# Patient Record
Sex: FEMALE | Race: WHITE | NOT HISPANIC OR LATINO | Employment: OTHER | URBAN - METROPOLITAN AREA
[De-identification: names, ages, dates, MRNs, and addresses within clinical notes are randomized per-mention and may not be internally consistent; named-entity substitution may affect disease eponyms.]

---

## 2017-06-13 ENCOUNTER — OFFICE VISIT (OUTPATIENT)
Dept: OBSTETRICS AND GYNECOLOGY | Facility: CLINIC | Age: 72
End: 2017-06-13

## 2017-06-13 VITALS
SYSTOLIC BLOOD PRESSURE: 170 MMHG | HEIGHT: 60 IN | WEIGHT: 137 LBS | DIASTOLIC BLOOD PRESSURE: 90 MMHG | BODY MASS INDEX: 26.9 KG/M2

## 2017-06-13 DIAGNOSIS — N81.3: ICD-10-CM

## 2017-06-13 DIAGNOSIS — N95.0 PMB (POSTMENOPAUSAL BLEEDING): Primary | ICD-10-CM

## 2017-06-13 PROCEDURE — 99203 OFFICE O/P NEW LOW 30 MIN: CPT | Performed by: OBSTETRICS & GYNECOLOGY

## 2017-06-13 RX ORDER — FLUTICASONE PROPIONATE 110 UG/1
1 AEROSOL, METERED RESPIRATORY (INHALATION) DAILY
COMMUNITY

## 2017-06-13 RX ORDER — LEVOTHYROXINE SODIUM 0.1 MG/1
50 TABLET ORAL DAILY
COMMUNITY
End: 2017-07-30 | Stop reason: DRUGHIGH

## 2017-06-13 RX ORDER — BENAZEPRIL HYDROCHLORIDE 10 MG/1
20 TABLET ORAL DAILY
COMMUNITY
End: 2017-07-30 | Stop reason: DRUGHIGH

## 2017-06-13 NOTE — PROGRESS NOTES
Subjective   Chief Complaint   Patient presents with   • Gynecologic Exam   • Vaginal Bleeding     seen ER at [facility]. Had US and CAT scan, UA done   • Vaginal Prolapse     Melani Medina is a 71 y.o. year old  menopausal female presenting to be seen for her annual exam.  This past year she has not been on hormone replacement therapy.  There has been vaginal bleeding in the last 12 months. Patient had spotting for the last 3 months occasionally. On 17 the patient began bleeding heavily and was seen in [facility] ED. A vaginal US and CT were done. There is no bleeding now..Patient has Uterine prolapse for years.    SEXUAL Hx:  She is not currently sexually active.  In the past year there has not been new sexual partners.    Condoms are not typically used.  She would not like to be screened for STD's at today's exam.  HEALTH Hx:  She exercises regularly: yes.  She wears her seat belt:yes.  She has concerns about domestic violence: no.  She has noticed changes in height: no.  OTHER COMPLAINTS:  Nothing else    The following portions of the patient's history were reviewed and updated as appropriate:problem list, current medications, allergies, past family history, past medical history, past social history and past surgical history.    Smoking status: Never Smoker                                                                 Smokeless status: Not on file                         Review of Systems  Review of Systems - History obtained from the patient  General ROS: negative  Psychological ROS: negative  ENT ROS: negative  Allergy and Immunology ROS: positive for - seasonal allergies  Hematological and Lymphatic ROS: positive for - bruising  Endocrine ROS: positive for - hypothyroidism   Breast ROS: negative for breast lumps  No mammogram  Respiratory ROS: positive for - wheezing  Cardiovascular ROS: no chest pain or dyspnea on exertion  Gastrointestinal ROS: no abdominal pain, change in bowel habits, or black or bloody  "stools  No colonoscopy  Genito-Urinary ROS: no dysuria, trouble voiding, or hematuria  Musculoskeletal ROS: negative  Neurological ROS: no TIA or stroke symptoms  Dermatological ROS: negative          Objective   /90  Ht 60\" (152.4 cm)  Wt 137 lb (62.1 kg)  LMP 06/13/1990  BMI 26.76 kg/m2    General:  well developed; well nourished  no acute distress   Skin:  No suspicious lesions seen   Thyroid: normal to inspection and palpation   Breasts:  Examined in supine position  Symmetric without masses or skin dimpling  Nipples normal without inversion, lesions or discharge  There are no palpable axillary nodes   Abdomen: soft, non-tender; no masses  no umbilical or inginual hernias are present  no hepato-splenomegaly   Heart: regular rate and rhythm, S1, S2 normal, no murmur, click, rub or gallop   Lungs: clear to auscultation   Pelvis: Clinical staff was present for exam  External genitalia:  normal appearance of the external genitalia including Bartholin's and Redfield's glands.  :  urethral meatus normal; urethral hypermobility is absent.  Vaginal:  normal pink mucosa without prolapse or lesions.  Cervix:  normal appearance.  Uterus:  normal size, shape and consistency.  Adnexa:  normal bimanual exam of the adnexa.  Rectal:  digital rectal exam not performed; anus visually normal appearing.  Uterine GRADE 3          Assessment/Plan   Melani was seen today for gynecologic exam, vaginal bleeding and vaginal prolapse.    Diagnoses and all orders for this visit:    PMB (postmenopausal bleeding)  -     Ambulatory Referral to Gynecologic Oncology    Third degree uterine prolaps  -     Ambulatory Referral to Gynecologic Oncology       Send for US and CT  Appointment with Dr Hollins  Return 2 weeks     This note was electronically signed.    Juan Beasley MD   June 13, 2017  "

## 2017-06-27 ENCOUNTER — OFFICE VISIT (OUTPATIENT)
Dept: OBSTETRICS AND GYNECOLOGY | Facility: CLINIC | Age: 72
End: 2017-06-27

## 2017-06-27 VITALS
DIASTOLIC BLOOD PRESSURE: 90 MMHG | WEIGHT: 137 LBS | TEMPERATURE: 97.9 F | HEIGHT: 60 IN | SYSTOLIC BLOOD PRESSURE: 144 MMHG | BODY MASS INDEX: 26.9 KG/M2

## 2017-06-27 DIAGNOSIS — N95.0 PMB (POSTMENOPAUSAL BLEEDING): ICD-10-CM

## 2017-06-27 DIAGNOSIS — N81.3: Primary | ICD-10-CM

## 2017-06-27 PROCEDURE — 99211 OFF/OP EST MAY X REQ PHY/QHP: CPT | Performed by: OBSTETRICS & GYNECOLOGY

## 2017-06-27 NOTE — PROGRESS NOTES
Patient has an appointment on 7/13/17 with Dr Hollins, still having vaginal spotting, no records received from [facility]. No exam today    IMP: Uterine prolapse          Post menopausal bleeding    Plan: Refer to Dr Hollins, appointment on 7/13/17    Juan Beasley MD

## 2017-07-13 ENCOUNTER — OFFICE VISIT (OUTPATIENT)
Dept: GYNECOLOGIC ONCOLOGY | Facility: CLINIC | Age: 72
End: 2017-07-13

## 2017-07-13 VITALS
WEIGHT: 139 LBS | HEIGHT: 59 IN | HEART RATE: 65 BPM | SYSTOLIC BLOOD PRESSURE: 146 MMHG | RESPIRATION RATE: 16 BRPM | TEMPERATURE: 97.6 F | OXYGEN SATURATION: 95 % | BODY MASS INDEX: 28.02 KG/M2 | DIASTOLIC BLOOD PRESSURE: 71 MMHG

## 2017-07-13 DIAGNOSIS — N95.0 PMB (POSTMENOPAUSAL BLEEDING): ICD-10-CM

## 2017-07-13 DIAGNOSIS — B37.2 YEAST DERMATITIS: ICD-10-CM

## 2017-07-13 DIAGNOSIS — N81.3 COMPLETE UTERINE PROLAPSE: Primary | ICD-10-CM

## 2017-07-13 PROCEDURE — 99205 OFFICE O/P NEW HI 60 MIN: CPT | Performed by: OBSTETRICS & GYNECOLOGY

## 2017-07-13 RX ORDER — NYSTATIN 100000 [USP'U]/G
POWDER TOPICAL 2 TIMES DAILY
Qty: 1 EACH | Refills: 0 | Status: SHIPPED | OUTPATIENT
Start: 2017-07-13

## 2017-07-13 NOTE — PROGRESS NOTES
Melani Medina  [id number]  1945      Reason for visit:  Uterine prolapse and postmenopausal bleeding    Consultation:  Patient is being seen at the request of Dr. Beasley for evaluation of pelvic organ prolapse, postmenopausal bleeding.    History of present illness:  The patient is a 71 y.o. year old female who presents today for evaluation due to pelvic organ prolapse with vaginal bleeding.    The patient reports that she started to have vaginal spotting in the middle of May and she was scheduled to have her annual visit soon so she waited for that. Before she could get to that appt, she had an episode of heavy bleeding that prompted her to present to the emergency department at [facility]. She had a CT scan and TVUS there that showed 2mm endometrial thickness and no other gynecologic abnormalities. She was then eventually referred to Dr. Beasley who sent her to GYN Oncology. She says that since that 1 episode her bleeding has returned to bright red spotting and she also has vaginal discharge. As far as her prolapse, she feels that something is coming out and she has to splint often to empty her bladder or bowels. She denies constipation and feels that overall she empties well. She wears a pad for the spotting. She only gets up at night to check for bleeding at this time. She says her prolapse has been present for 4 years but has gotten much worse in the last few months. She desires to conserve vaginal function as she is  and would like to have intercourse. She was previously intimate prior to her prolapse worsening.  She notes intermittent complete procidentia.    OBGYN History:  She is a .  3 SVDs. Largest baby was 8lb 7oz. She does not use HRT. She does have a history of abnormal pap smear in  for which she had cryotherapy and had no other abnormal paps since. Her last one is unknown.     PCP: Akilah HORTON, [facility]. [place] office.      Oncologic  History:   No history exists.         Past Medical History:   Diagnosis Date   • Abnormal Pap smear of cervix    • Asthma    • Glaucoma    • Hypertension    • Hypothyroid    • Prolapse of uterus    • Skin cancer     mohs on nose       Past Surgical History:   Procedure Laterality Date   • CATARACT EXTRACTION Left     glaucoma   • CERVIX LESION DESTRUCTION     • LASIK Bilateral 2015   • SKIN CANCER EXCISION      mohs to nose       MEDICATIONS: The current medication list was reviewed with the patient and updated in the EMR this date per the Medical Assistant. Medication dosages and frequencies were confirmed to be accurate.    - Benazepril, synthroid, flovent    Allergies:  is allergic to other and sulfa antibiotics.    Social History:   Social History     Social History   • Marital status:      Spouse name: N/A   • Number of children: 3   • Years of education: N/A     Occupational History   • Not on file.     Social History Main Topics   • Smoking status: Former Smoker   • Smokeless tobacco: Not on file   • Alcohol use No   • Drug use: No   • Sexual activity: Defer     Other Topics Concern   • Not on file     Social History Narrative       Family History:    Family History   Problem Relation Age of Onset   • Heart disease Father    • Hypertension Father    • Other Father      pace maker   • Diabetes Brother    • Skin cancer Mother    • Other Mother      lung problems from smoking   • Breast cancer Neg Hx    • Ovarian cancer Neg Hx    • Uterine cancer Neg Hx    • Colon cancer Neg Hx        Health Maintenance:    Health Maintenance   Topic Date Due   • TDAP/TD VACCINES (1 - Tdap) 08/08/1964   • PNEUMOCOCCAL VACCINES (65+ LOW/MEDIUM RISK) (1 of 2 - PCV13) 08/08/2010   • HEPATITIS C SCREENING  06/13/2017   • MEDICARE ANNUAL WELLNESS  06/13/2017   • MAMMOGRAM  06/13/2017   • COLONOSCOPY  06/13/2017   • ZOSTER VACCINE  06/13/2017   • INFLUENZA VACCINE  08/01/2017       Review of Systems   Constitutional: Positive  "for activity change. Negative for appetite change, chills, fatigue, fever and unexpected weight change.   HENT: Negative for congestion, hearing loss, sore throat and trouble swallowing.    Eyes: Negative for redness and visual disturbance.        Glaucoma   Respiratory: Negative for cough, shortness of breath and wheezing.         Asthma for which patient \"sleeps propped up\"   Cardiovascular: Negative for chest pain, palpitations and leg swelling.   Gastrointestinal: Negative for abdominal distention, abdominal pain, blood in stool, constipation, diarrhea, nausea and vomiting.        Splints to defecate   Endocrine: Negative.  Negative for cold intolerance and heat intolerance.   Genitourinary: Positive for difficulty urinating, vaginal bleeding and vaginal discharge. Negative for dyspareunia, dysuria, frequency, genital sores, hematuria, pelvic pain, urgency and vaginal pain.        Splints to urinate   Musculoskeletal: Negative for arthralgias, back pain, gait problem and joint swelling.   Skin: Negative for rash and wound.   Allergic/Immunologic: Negative for food allergies and immunocompromised state.   Neurological: Negative for dizziness, tremors, seizures, syncope, weakness, light-headedness, numbness and headaches.   Hematological: Negative for adenopathy. Does not bruise/bleed easily.   Psychiatric/Behavioral: Negative.  Negative for confusion, dysphoric mood and sleep disturbance. The patient is not nervous/anxious.        Physical Exam    Vitals:    07/13/17 0946   BP: 146/71   Pulse: 65   Resp: 16   Temp: 97.6 °F (36.4 °C)   TempSrc: Temporal Artery    SpO2: 95%   Weight: 139 lb (63 kg)   Height: 58.5\" (148.6 cm)     Body mass index is 28.56 kg/(m^2).      GENERAL: Alert, well-appearing female appearing her stated age who is in no apparent distress.   HEENT: Sclera anicteric. Head normocephalic, atraumatic. Mucus membranes moist.   NECK: Trachea midline, supple, without masses.  No thyromegaly. "   BREASTS: Deferred  CARDIOVASCULAR: Normal rate, regular rhythm, no murmurs, rubs, or gallops.  No peripheral edema.  RESPIRATORY: Clear to auscultation bilaterally, normal respiratory effort  GASTROINTESTINAL:  Abdomen is soft, non-tender, non-distended, no rebound or guarding, no masses, or hernias. No HSM.   SKIN:  Warm, dry, well-perfused.  All visible areas intact.  No rashes, lesions, ulcers.  PSYCHIATRIC: AO x3, with appropriate affect, normal thought processes.  NEUROLOGIC: No focal deficits.  Moves extremities well.  MUSCULOSKELETAL: Normal gait and station.   EXTREMITIES:   No cyanosis, clubbing, symmetric.  LYMPHATICS:  No cervical or inguinal adenopathy noted.     PELVIC exam:    GYNECOLOGIC:  External genitalia normal but creases near legs demonstrate redness and irritation. Cervix noted to be visible at the level of the hymenal ring without Valsalva. Not much change to prolapse with bearing down. On speculum examination, the cervix appeared to be severely ulcerated circumferentially. Small rectocele noted on single blade exam. On bimanual examination no mass was appreciated.  Uterus was normal in size and shape. There is no cervical motion or uterine tenderness. No cervical mass was palpated. Parametria were smooth. Rectovaginal exam was deferred.     ECOG PS 1      Assessment/Plan   This is a 71 y.o. woman with significant symptomatic pelvic organ prolapse, post menopausal vaginal bleeding.     1. Prolapse; and intermittent complete procidentia, splinting for facilitation of defecation and urination.  - Reports from outside hospital CT and TVUS were available and reviewed today. On exam, there appears to be mostly apical prolapse with small anterior and posterior components. Cervix is ulcerated in several areas and is likely the cause of her symptoms. Definitive management would require hysterectomy which the patient desires. She says that this issue has been debilitating for her and she wants it  corrected. We discussed options for surgical management such as Robotic total laparoscopic hysterectomy/ bilateral salpingo-oophorectomy versus total vaginal hysterectomy/ bilateral salpingo-oophorectomy. Addition to these, we discussed methods to increase pelvic floor support such as laparoscopic sacralcolpopexy, uterosacral suspension, vs. colpocleisis. She is currently considering a vaginal preservation approach but has concerns about mesh placement. She was counseled about mesh complications including chronic pain, dyspareunia, infection (pelvic and osteomyelitis) and erosion that can require reoperation. She was counseled about uterosacral suspension that sutures are placed in close proximety of ureters and there is risk of ureteral injury, especially if done vaginally. The option of a pessary after vaginal hysterectomy was discussed as well. We still recommend hysterectomy to eliminate her vaginal bleeding and discharge.A surgical prolapse repair at time of hysterectomy is indicated. Due to her current issues with ulceration, we discussed that vaginal hysterectomy with pessary placement this would likely not be a good option, but she wants to consider this. The patient will consider her options and call the office after discussing with her .  She preferred for her  not to join in the discussion today at the office today.  Diagrams were drawn, there was extensive discussion regarding options, and patient was given a copy of the document.    2. Postmenopausal bleeding  - Outside scans show 2mm endometrial stripe which is not concerning for endometrial hyperplasia or endometrial caner. Exam reveals ulceration at cervix itself being likely source of bleeding with no blood coming from the os. No concern at this time for malignancy and no endometrial biopsy indicated at this time.     3. Cutaneous candidiasis  - Noted between legs near vulva. Discussed cool blow dryer to the area to keep dry and  prescribed nystatin powder.     FOLLOW UP: Return for patient is to call if she desires procedure..    This was a 1 hour long visit with 35 minutes spent in face-to-face consultation regarding management options for significant pelvic organ prolapse.    Note: Speech recognition transcription software was used to dictate portions of this document.  An attempt at proofreading has been made though minor errors in transcription may still be present.  Please do not hesitate to call our office with any questions.      Patient was seen and examined with Dr. Troncoso,  resident, who performed portions of the examination and documentation for this patient's care under my direct supervision.        Electronically Signed by: Kerline Hollins MD  Date: 7/14/2017

## 2017-07-20 ENCOUNTER — PREP FOR SURGERY (OUTPATIENT)
Dept: GYNECOLOGIC ONCOLOGY | Facility: CLINIC | Age: 72
End: 2017-07-20

## 2017-07-20 DIAGNOSIS — E07.9 DISORDER OF THYROID: ICD-10-CM

## 2017-07-20 DIAGNOSIS — N81.4 UTEROVAGINAL PROLAPSE: Primary | ICD-10-CM

## 2017-07-21 RX ORDER — ACETAMINOPHEN 325 MG/1
650 TABLET ORAL ONCE
Status: CANCELLED | OUTPATIENT
Start: 2017-07-21 | End: 2017-07-21

## 2017-07-21 RX ORDER — PREGABALIN 25 MG/1
150 CAPSULE ORAL ONCE
Status: CANCELLED | OUTPATIENT
Start: 2017-07-21 | End: 2017-07-21

## 2017-07-21 RX ORDER — SODIUM CHLORIDE 0.9 % (FLUSH) 0.9 %
1-10 SYRINGE (ML) INJECTION AS NEEDED
Status: CANCELLED | OUTPATIENT
Start: 2017-07-21

## 2017-07-24 ENCOUNTER — PREP FOR SURGERY (OUTPATIENT)
Dept: GYNECOLOGIC ONCOLOGY | Facility: CLINIC | Age: 72
End: 2017-07-24

## 2017-07-24 PROBLEM — N81.4 UTEROVAGINAL PROLAPSE: Status: ACTIVE | Noted: 2017-07-24

## 2017-07-24 NOTE — PATIENT INSTRUCTIONS
Gynecologic Oncology  Inpatient Pre-op Patient Education  *See checked boxes for your instructions*    Patient Name:  Melani Medina  [id number]  1945    Surgeon:  Dr. Hollins    Appointment  [x]  1. Your surgery has been scheduled on 7-31-17. You will need to be at the second floor surgery registration of the main hospital on that day at 8:30 AM.   [x] 2.  You have a pre-admission testing (PAT) appointment for labs and possibly chest xray and EKG, on 7-30-17 at 12:45 PM.  You will need to be at hospital registration on the first floor, 10 minutes before that time.     [x] 3.  The hospital registration department is located in the long hallway between the [address] buildings.     The Day(s) Before Surgery  [x] 1. On 7-30-17, the day prior to surgery, eat lightly.  No solid food after midnight on 7-30-17, including NO MILK, CREAM, OR ORANGE JUICE.  You may have sips of clear fluids up until two-three hours prior to your arrival to the hospital on the morning of surgery.     [] 2.  You may need to use a stool softener, the day prior to surgery to help with existing constipation and to clean out your bowels.  You can purchase Miralax over-the-counter at the pharmacy and follow the directions on the back.  (Do not do this step unless the box is checked).   [x] 3.  Do not take vitamins or full dose aspirin one week before surgery.  If you normally take a blood thinning medication such as Warfarin, Eliquis, or Xarelto, we will give you specific instructions regarding these medications and we may need to talk with your other doctors.   [x] 4.  On the morning of your surgery, you may likely take your routine prescription medications with a sip of water as reviewed with you by your surgeon.  Bring your home medications with you to the hospital as we may need to reference these.  In particular be sure to bring any inhalers.     Post-surgery Instructions  [x] 1.  The length of stay for your type of surgery is typically  one hospital night, however it is also possible that you could be discharged home in the evening on the same day depending on the nature of your surgery.  All rooms are private, so family member may stay with you.     [x] 2.  Do not take your own home prescription medication while you are in the hospital unless otherwise instructed.  These will be provided to you.         Comments:

## 2017-07-30 ENCOUNTER — HOSPITAL ENCOUNTER (OUTPATIENT)
Dept: GENERAL RADIOLOGY | Facility: HOSPITAL | Age: 72
Discharge: HOME | End: 2017-07-30
Admitting: OBSTETRICS & GYNECOLOGY

## 2017-07-30 ENCOUNTER — APPOINTMENT (OUTPATIENT)
Dept: PREADMISSION TESTING | Facility: HOSPITAL | Age: 72
End: 2017-07-30

## 2017-07-30 VITALS — BODY MASS INDEX: 27.4 KG/M2 | HEIGHT: 60 IN | WEIGHT: 139.55 LBS

## 2017-07-30 DIAGNOSIS — E07.9 DISORDER OF THYROID: ICD-10-CM

## 2017-07-30 DIAGNOSIS — N81.4 UTEROVAGINAL PROLAPSE: ICD-10-CM

## 2017-07-30 LAB
ABO GROUP BLD: NORMAL
ALBUMIN SERPL-MCNC: 4.2 G/DL (ref 3.2–4.8)
ALBUMIN/GLOB SERPL: 1.5 G/DL (ref 1.5–2.5)
ALP SERPL-CCNC: 59 U/L (ref 25–100)
ALT SERPL W P-5'-P-CCNC: 19 U/L (ref 7–40)
ANION GAP SERPL CALCULATED.3IONS-SCNC: 3 MMOL/L (ref 3–11)
AST SERPL-CCNC: 19 U/L (ref 0–33)
BASOPHILS # BLD AUTO: 0.03 10*3/MM3 (ref 0–0.2)
BASOPHILS NFR BLD AUTO: 0.4 % (ref 0–1)
BILIRUB SERPL-MCNC: 0.4 MG/DL (ref 0.3–1.2)
BLD GP AB SCN SERPL QL: NEGATIVE
BUN BLD-MCNC: 15 MG/DL (ref 9–23)
BUN/CREAT SERPL: 25 (ref 7–25)
CALCIUM SPEC-SCNC: 9.9 MG/DL (ref 8.7–10.4)
CHLORIDE SERPL-SCNC: 109 MMOL/L (ref 99–109)
CO2 SERPL-SCNC: 31 MMOL/L (ref 20–31)
CREAT BLD-MCNC: 0.6 MG/DL (ref 0.6–1.3)
DEPRECATED RDW RBC AUTO: 45 FL (ref 37–54)
EOSINOPHIL # BLD AUTO: 0.18 10*3/MM3 (ref 0–0.3)
EOSINOPHIL NFR BLD AUTO: 2.6 % (ref 0–3)
ERYTHROCYTE [DISTWIDTH] IN BLOOD BY AUTOMATED COUNT: 13 % (ref 11.3–14.5)
GFR SERPL CREATININE-BSD FRML MDRD: 99 ML/MIN/1.73
GLOBULIN UR ELPH-MCNC: 2.8 GM/DL
GLUCOSE BLD-MCNC: 96 MG/DL (ref 70–100)
HBA1C MFR BLD: 5.9 % (ref 4.8–5.6)
HCT VFR BLD AUTO: 40.9 % (ref 34.5–44)
HGB BLD-MCNC: 13.4 G/DL (ref 11.5–15.5)
IMM GRANULOCYTES # BLD: 0.03 10*3/MM3 (ref 0–0.03)
IMM GRANULOCYTES NFR BLD: 0.4 % (ref 0–0.6)
LYMPHOCYTES # BLD AUTO: 1.95 10*3/MM3 (ref 0.6–4.8)
LYMPHOCYTES NFR BLD AUTO: 28.5 % (ref 24–44)
MCH RBC QN AUTO: 30.6 PG (ref 27–31)
MCHC RBC AUTO-ENTMCNC: 32.8 G/DL (ref 32–36)
MCV RBC AUTO: 93.4 FL (ref 80–99)
MONOCYTES # BLD AUTO: 0.53 10*3/MM3 (ref 0–1)
MONOCYTES NFR BLD AUTO: 7.7 % (ref 0–12)
NEUTROPHILS # BLD AUTO: 4.13 10*3/MM3 (ref 1.5–8.3)
NEUTROPHILS NFR BLD AUTO: 60.4 % (ref 41–71)
PLATELET # BLD AUTO: 234 10*3/MM3 (ref 150–450)
PMV BLD AUTO: 9.3 FL (ref 6–12)
POTASSIUM BLD-SCNC: 4.4 MMOL/L (ref 3.5–5.5)
PROT SERPL-MCNC: 7 G/DL (ref 5.7–8.2)
RBC # BLD AUTO: 4.38 10*6/MM3 (ref 3.89–5.14)
RH BLD: POSITIVE
SODIUM BLD-SCNC: 143 MMOL/L (ref 132–146)
WBC NRBC COR # BLD: 6.85 10*3/MM3 (ref 3.5–10.8)

## 2017-07-30 PROCEDURE — 86901 BLOOD TYPING SEROLOGIC RH(D): CPT | Performed by: OBSTETRICS & GYNECOLOGY

## 2017-07-30 PROCEDURE — 86900 BLOOD TYPING SEROLOGIC ABO: CPT | Performed by: OBSTETRICS & GYNECOLOGY

## 2017-07-30 PROCEDURE — 93005 ELECTROCARDIOGRAM TRACING: CPT

## 2017-07-30 PROCEDURE — 83036 HEMOGLOBIN GLYCOSYLATED A1C: CPT | Performed by: ANESTHESIOLOGY

## 2017-07-30 PROCEDURE — 85025 COMPLETE CBC W/AUTO DIFF WBC: CPT | Performed by: OBSTETRICS & GYNECOLOGY

## 2017-07-30 PROCEDURE — 93010 ELECTROCARDIOGRAM REPORT: CPT | Performed by: INTERNAL MEDICINE

## 2017-07-30 PROCEDURE — 71020 HC CHEST PA AND LATERAL: CPT

## 2017-07-30 PROCEDURE — 86850 RBC ANTIBODY SCREEN: CPT | Performed by: OBSTETRICS & GYNECOLOGY

## 2017-07-30 PROCEDURE — 80053 COMPREHEN METABOLIC PANEL: CPT | Performed by: OBSTETRICS & GYNECOLOGY

## 2017-07-30 PROCEDURE — 36415 COLL VENOUS BLD VENIPUNCTURE: CPT

## 2017-07-30 RX ORDER — ALBUTEROL SULFATE 90 UG/1
2 AEROSOL, METERED RESPIRATORY (INHALATION) EVERY 4 HOURS PRN
COMMUNITY

## 2017-07-30 RX ORDER — BENAZEPRIL HYDROCHLORIDE 20 MG/1
20 TABLET ORAL DAILY
COMMUNITY

## 2017-07-30 RX ORDER — LEVOTHYROXINE SODIUM 0.05 MG/1
50 TABLET ORAL DAILY
COMMUNITY

## 2017-07-31 ENCOUNTER — HOSPITAL ENCOUNTER (OUTPATIENT)
Facility: HOSPITAL | Age: 72
Discharge: HOME | End: 2017-08-01
Attending: OBSTETRICS & GYNECOLOGY | Admitting: OBSTETRICS & GYNECOLOGY

## 2017-07-31 ENCOUNTER — ANESTHESIA (OUTPATIENT)
Dept: PERIOP | Facility: HOSPITAL | Age: 72
End: 2017-07-31

## 2017-07-31 ENCOUNTER — ANESTHESIA EVENT (OUTPATIENT)
Dept: PERIOP | Facility: HOSPITAL | Age: 72
End: 2017-07-31

## 2017-07-31 DIAGNOSIS — N81.4 UTEROVAGINAL PROLAPSE: ICD-10-CM

## 2017-07-31 PROBLEM — N81.9 PROLAPSE OF FEMALE PELVIC ORGANS: Status: ACTIVE | Noted: 2017-07-31

## 2017-07-31 LAB
ABO GROUP BLD: NORMAL
RH BLD: POSITIVE

## 2017-07-31 PROCEDURE — G0378 HOSPITAL OBSERVATION PER HR: HCPCS

## 2017-07-31 PROCEDURE — 94760 N-INVAS EAR/PLS OXIMETRY 1: CPT

## 2017-07-31 PROCEDURE — 94640 AIRWAY INHALATION TREATMENT: CPT

## 2017-07-31 PROCEDURE — 86901 BLOOD TYPING SEROLOGIC RH(D): CPT

## 2017-07-31 PROCEDURE — 94799 UNLISTED PULMONARY SVC/PX: CPT

## 2017-07-31 PROCEDURE — 25010000002 PROPOFOL 10 MG/ML EMULSION: Performed by: NURSE ANESTHETIST, CERTIFIED REGISTERED

## 2017-07-31 PROCEDURE — 25010000002 FENTANYL CITRATE (PF) 100 MCG/2ML SOLUTION: Performed by: NURSE ANESTHETIST, CERTIFIED REGISTERED

## 2017-07-31 PROCEDURE — 57120 COLPOCLEISIS LE FORT TYPE: CPT | Performed by: OBSTETRICS & GYNECOLOGY

## 2017-07-31 PROCEDURE — 58262 VAG HYST INCLUDING T/O: CPT | Performed by: OBSTETRICS & GYNECOLOGY

## 2017-07-31 PROCEDURE — 86900 BLOOD TYPING SEROLOGIC ABO: CPT

## 2017-07-31 PROCEDURE — 25010000002 CEFOXITIN: Performed by: OBSTETRICS & GYNECOLOGY

## 2017-07-31 PROCEDURE — 88309 TISSUE EXAM BY PATHOLOGIST: CPT | Performed by: OBSTETRICS & GYNECOLOGY

## 2017-07-31 RX ORDER — MAGNESIUM HYDROXIDE 1200 MG/15ML
LIQUID ORAL AS NEEDED
Status: DISCONTINUED | OUTPATIENT
Start: 2017-07-31 | End: 2017-07-31 | Stop reason: HOSPADM

## 2017-07-31 RX ORDER — LABETALOL HYDROCHLORIDE 5 MG/ML
5 INJECTION, SOLUTION INTRAVENOUS
Status: DISCONTINUED | OUTPATIENT
Start: 2017-07-31 | End: 2017-07-31 | Stop reason: HOSPADM

## 2017-07-31 RX ORDER — PROMETHAZINE HYDROCHLORIDE 25 MG/1
25 TABLET ORAL ONCE AS NEEDED
Status: DISCONTINUED | OUTPATIENT
Start: 2017-07-31 | End: 2017-07-31 | Stop reason: HOSPADM

## 2017-07-31 RX ORDER — LIDOCAINE HYDROCHLORIDE 10 MG/ML
INJECTION, SOLUTION EPIDURAL; INFILTRATION; INTRACAUDAL; PERINEURAL AS NEEDED
Status: DISCONTINUED | OUTPATIENT
Start: 2017-07-31 | End: 2017-07-31 | Stop reason: SURG

## 2017-07-31 RX ORDER — HYDROMORPHONE HYDROCHLORIDE 1 MG/ML
0.5 INJECTION, SOLUTION INTRAMUSCULAR; INTRAVENOUS; SUBCUTANEOUS
Status: DISCONTINUED | OUTPATIENT
Start: 2017-07-31 | End: 2017-07-31 | Stop reason: HOSPADM

## 2017-07-31 RX ORDER — SODIUM CHLORIDE 9 MG/ML
INJECTION, SOLUTION INTRAVENOUS AS NEEDED
Status: DISCONTINUED | OUTPATIENT
Start: 2017-07-31 | End: 2017-07-31 | Stop reason: HOSPADM

## 2017-07-31 RX ORDER — IPRATROPIUM BROMIDE AND ALBUTEROL SULFATE 2.5; .5 MG/3ML; MG/3ML
3 SOLUTION RESPIRATORY (INHALATION) ONCE AS NEEDED
Status: DISCONTINUED | OUTPATIENT
Start: 2017-07-31 | End: 2017-07-31 | Stop reason: HOSPADM

## 2017-07-31 RX ORDER — SODIUM CHLORIDE 0.9 % (FLUSH) 0.9 %
1-10 SYRINGE (ML) INJECTION AS NEEDED
Status: DISCONTINUED | OUTPATIENT
Start: 2017-07-31 | End: 2017-08-01 | Stop reason: HOSPADM

## 2017-07-31 RX ORDER — ONDANSETRON 2 MG/ML
4 INJECTION INTRAMUSCULAR; INTRAVENOUS EVERY 6 HOURS PRN
Status: DISCONTINUED | OUTPATIENT
Start: 2017-07-31 | End: 2017-08-01 | Stop reason: HOSPADM

## 2017-07-31 RX ORDER — ONDANSETRON 2 MG/ML
4 INJECTION INTRAMUSCULAR; INTRAVENOUS ONCE AS NEEDED
Status: DISCONTINUED | OUTPATIENT
Start: 2017-07-31 | End: 2017-07-31 | Stop reason: HOSPADM

## 2017-07-31 RX ORDER — HYDRALAZINE HYDROCHLORIDE 20 MG/ML
5 INJECTION INTRAMUSCULAR; INTRAVENOUS
Status: DISCONTINUED | OUTPATIENT
Start: 2017-07-31 | End: 2017-07-31 | Stop reason: HOSPADM

## 2017-07-31 RX ORDER — MORPHINE SULFATE 2 MG/ML
1 INJECTION, SOLUTION INTRAMUSCULAR; INTRAVENOUS
Status: DISCONTINUED | OUTPATIENT
Start: 2017-07-31 | End: 2017-08-01 | Stop reason: HOSPADM

## 2017-07-31 RX ORDER — PROMETHAZINE HYDROCHLORIDE 25 MG/1
25 SUPPOSITORY RECTAL ONCE AS NEEDED
Status: DISCONTINUED | OUTPATIENT
Start: 2017-07-31 | End: 2017-07-31 | Stop reason: HOSPADM

## 2017-07-31 RX ORDER — ACETAMINOPHEN 325 MG/1
650 TABLET ORAL EVERY 4 HOURS PRN
Status: DISCONTINUED | OUTPATIENT
Start: 2017-07-31 | End: 2017-08-01 | Stop reason: HOSPADM

## 2017-07-31 RX ORDER — PROMETHAZINE HYDROCHLORIDE 25 MG/ML
12.5 INJECTION, SOLUTION INTRAMUSCULAR; INTRAVENOUS EVERY 6 HOURS PRN
Status: DISCONTINUED | OUTPATIENT
Start: 2017-07-31 | End: 2017-08-01 | Stop reason: HOSPADM

## 2017-07-31 RX ORDER — POLYETHYLENE GLYCOL 3350 17 G/17G
17 POWDER, FOR SOLUTION ORAL DAILY
Status: DISCONTINUED | OUTPATIENT
Start: 2017-07-31 | End: 2017-08-01 | Stop reason: HOSPADM

## 2017-07-31 RX ORDER — SODIUM CHLORIDE, SODIUM LACTATE, POTASSIUM CHLORIDE, CALCIUM CHLORIDE 600; 310; 30; 20 MG/100ML; MG/100ML; MG/100ML; MG/100ML
75 INJECTION, SOLUTION INTRAVENOUS CONTINUOUS
Status: DISCONTINUED | OUTPATIENT
Start: 2017-07-31 | End: 2017-08-01

## 2017-07-31 RX ORDER — PROMETHAZINE HYDROCHLORIDE 25 MG/ML
6.25 INJECTION, SOLUTION INTRAMUSCULAR; INTRAVENOUS ONCE AS NEEDED
Status: DISCONTINUED | OUTPATIENT
Start: 2017-07-31 | End: 2017-07-31 | Stop reason: HOSPADM

## 2017-07-31 RX ORDER — FAMOTIDINE 20 MG/1
20 TABLET, FILM COATED ORAL ONCE
Status: COMPLETED | OUTPATIENT
Start: 2017-07-31 | End: 2017-07-31

## 2017-07-31 RX ORDER — OXYCODONE HYDROCHLORIDE 5 MG/1
5 TABLET ORAL EVERY 4 HOURS PRN
Status: DISCONTINUED | OUTPATIENT
Start: 2017-07-31 | End: 2017-08-01 | Stop reason: HOSPADM

## 2017-07-31 RX ORDER — PROPOFOL 10 MG/ML
VIAL (ML) INTRAVENOUS AS NEEDED
Status: DISCONTINUED | OUTPATIENT
Start: 2017-07-31 | End: 2017-07-31 | Stop reason: SURG

## 2017-07-31 RX ORDER — SODIUM CHLORIDE 0.9 % (FLUSH) 0.9 %
1-10 SYRINGE (ML) INJECTION AS NEEDED
Status: DISCONTINUED | OUTPATIENT
Start: 2017-07-31 | End: 2017-07-31 | Stop reason: HOSPADM

## 2017-07-31 RX ORDER — PROMETHAZINE HYDROCHLORIDE 12.5 MG/1
12.5 SUPPOSITORY RECTAL EVERY 6 HOURS PRN
Status: DISCONTINUED | OUTPATIENT
Start: 2017-07-31 | End: 2017-08-01 | Stop reason: HOSPADM

## 2017-07-31 RX ORDER — NALOXONE HCL 0.4 MG/ML
0.4 VIAL (ML) INJECTION AS NEEDED
Status: DISCONTINUED | OUTPATIENT
Start: 2017-07-31 | End: 2017-07-31 | Stop reason: HOSPADM

## 2017-07-31 RX ORDER — NALOXONE HCL 0.4 MG/ML
0.4 VIAL (ML) INJECTION
Status: DISCONTINUED | OUTPATIENT
Start: 2017-07-31 | End: 2017-08-01 | Stop reason: HOSPADM

## 2017-07-31 RX ORDER — SIMETHICONE 80 MG
80 TABLET,CHEWABLE ORAL 4 TIMES DAILY PRN
Status: DISCONTINUED | OUTPATIENT
Start: 2017-07-31 | End: 2017-08-01 | Stop reason: HOSPADM

## 2017-07-31 RX ORDER — ATRACURIUM BESYLATE 10 MG/ML
INJECTION, SOLUTION INTRAVENOUS AS NEEDED
Status: DISCONTINUED | OUTPATIENT
Start: 2017-07-31 | End: 2017-07-31 | Stop reason: SURG

## 2017-07-31 RX ORDER — FAMOTIDINE 10 MG/ML
20 INJECTION, SOLUTION INTRAVENOUS ONCE
Status: CANCELLED | OUTPATIENT
Start: 2017-07-31 | End: 2017-07-31

## 2017-07-31 RX ORDER — BENAZEPRIL HYDROCHLORIDE 5 MG/1
20 TABLET, FILM COATED ORAL DAILY
Status: DISCONTINUED | OUTPATIENT
Start: 2017-08-01 | End: 2017-08-01 | Stop reason: HOSPADM

## 2017-07-31 RX ORDER — FENTANYL CITRATE 50 UG/ML
50 INJECTION, SOLUTION INTRAMUSCULAR; INTRAVENOUS
Status: DISCONTINUED | OUTPATIENT
Start: 2017-07-31 | End: 2017-07-31 | Stop reason: HOSPADM

## 2017-07-31 RX ORDER — BUDESONIDE 0.5 MG/2ML
0.5 INHALANT ORAL
Status: DISCONTINUED | OUTPATIENT
Start: 2017-07-31 | End: 2017-08-01 | Stop reason: HOSPADM

## 2017-07-31 RX ORDER — ONDANSETRON 4 MG/1
4 TABLET, FILM COATED ORAL EVERY 6 HOURS PRN
Status: DISCONTINUED | OUTPATIENT
Start: 2017-07-31 | End: 2017-08-01 | Stop reason: HOSPADM

## 2017-07-31 RX ORDER — PREGABALIN 75 MG/1
150 CAPSULE ORAL ONCE
Status: COMPLETED | OUTPATIENT
Start: 2017-07-31 | End: 2017-07-31

## 2017-07-31 RX ORDER — LEVOTHYROXINE SODIUM 0.05 MG/1
50 TABLET ORAL DAILY
Status: DISCONTINUED | OUTPATIENT
Start: 2017-07-31 | End: 2017-08-01 | Stop reason: HOSPADM

## 2017-07-31 RX ORDER — DIPHENHYDRAMINE HCL 25 MG
25 CAPSULE ORAL NIGHTLY PRN
Status: DISCONTINUED | OUTPATIENT
Start: 2017-07-31 | End: 2017-08-01 | Stop reason: HOSPADM

## 2017-07-31 RX ORDER — SODIUM CHLORIDE, SODIUM LACTATE, POTASSIUM CHLORIDE, CALCIUM CHLORIDE 600; 310; 30; 20 MG/100ML; MG/100ML; MG/100ML; MG/100ML
9 INJECTION, SOLUTION INTRAVENOUS CONTINUOUS
Status: DISCONTINUED | OUTPATIENT
Start: 2017-07-31 | End: 2017-07-31

## 2017-07-31 RX ORDER — DOCUSATE SODIUM 100 MG/1
200 CAPSULE, LIQUID FILLED ORAL 2 TIMES DAILY
Status: DISCONTINUED | OUTPATIENT
Start: 2017-07-31 | End: 2017-08-01 | Stop reason: HOSPADM

## 2017-07-31 RX ORDER — FENTANYL CITRATE 50 UG/ML
INJECTION, SOLUTION INTRAMUSCULAR; INTRAVENOUS AS NEEDED
Status: DISCONTINUED | OUTPATIENT
Start: 2017-07-31 | End: 2017-07-31 | Stop reason: SURG

## 2017-07-31 RX ORDER — DIPHENHYDRAMINE HYDROCHLORIDE 50 MG/ML
25 INJECTION INTRAMUSCULAR; INTRAVENOUS NIGHTLY PRN
Status: DISCONTINUED | OUTPATIENT
Start: 2017-07-31 | End: 2017-08-01 | Stop reason: HOSPADM

## 2017-07-31 RX ORDER — LIDOCAINE HYDROCHLORIDE 10 MG/ML
0.5 INJECTION, SOLUTION EPIDURAL; INFILTRATION; INTRACAUDAL; PERINEURAL ONCE AS NEEDED
Status: COMPLETED | OUTPATIENT
Start: 2017-07-31 | End: 2017-07-31

## 2017-07-31 RX ORDER — OXYCODONE HYDROCHLORIDE AND ACETAMINOPHEN 5; 325 MG/1; MG/1
1 TABLET ORAL ONCE AS NEEDED
Status: DISCONTINUED | OUTPATIENT
Start: 2017-07-31 | End: 2017-07-31 | Stop reason: HOSPADM

## 2017-07-31 RX ORDER — ACETAMINOPHEN 325 MG/1
650 TABLET ORAL ONCE
Status: COMPLETED | OUTPATIENT
Start: 2017-07-31 | End: 2017-07-31

## 2017-07-31 RX ORDER — OXYCODONE AND ACETAMINOPHEN 7.5; 325 MG/1; MG/1
1 TABLET ORAL ONCE AS NEEDED
Status: DISCONTINUED | OUTPATIENT
Start: 2017-07-31 | End: 2017-07-31 | Stop reason: HOSPADM

## 2017-07-31 RX ORDER — NYSTATIN 100000 [USP'U]/G
1 POWDER TOPICAL 2 TIMES DAILY PRN
Status: DISCONTINUED | OUTPATIENT
Start: 2017-07-31 | End: 2017-08-01 | Stop reason: HOSPADM

## 2017-07-31 RX ORDER — IBUPROFEN 600 MG/1
600 TABLET ORAL EVERY 6 HOURS SCHEDULED
Status: DISCONTINUED | OUTPATIENT
Start: 2017-07-31 | End: 2017-08-01 | Stop reason: HOSPADM

## 2017-07-31 RX ORDER — BUPIVACAINE HYDROCHLORIDE AND EPINEPHRINE 2.5; 5 MG/ML; UG/ML
INJECTION, SOLUTION INFILTRATION; PERINEURAL AS NEEDED
Status: DISCONTINUED | OUTPATIENT
Start: 2017-07-31 | End: 2017-07-31 | Stop reason: HOSPADM

## 2017-07-31 RX ORDER — PROMETHAZINE HYDROCHLORIDE 12.5 MG/1
12.5 TABLET ORAL EVERY 6 HOURS PRN
Status: DISCONTINUED | OUTPATIENT
Start: 2017-07-31 | End: 2017-08-01 | Stop reason: HOSPADM

## 2017-07-31 RX ADMIN — EPHEDRINE SULFATE 5 MG: 50 INJECTION INTRAMUSCULAR; INTRAVENOUS; SUBCUTANEOUS at 12:27

## 2017-07-31 RX ADMIN — DOCUSATE SODIUM 200 MG: 100 CAPSULE, LIQUID FILLED ORAL at 18:10

## 2017-07-31 RX ADMIN — SODIUM CHLORIDE, POTASSIUM CHLORIDE, SODIUM LACTATE AND CALCIUM CHLORIDE: 600; 310; 30; 20 INJECTION, SOLUTION INTRAVENOUS at 14:00

## 2017-07-31 RX ADMIN — ACETAMINOPHEN 650 MG: 325 TABLET ORAL at 09:28

## 2017-07-31 RX ADMIN — LIDOCAINE HYDROCHLORIDE 0.2 ML: 10 INJECTION, SOLUTION EPIDURAL; INFILTRATION; INTRACAUDAL; PERINEURAL at 09:00

## 2017-07-31 RX ADMIN — CEFOXITIN 2 G: 2 INJECTION, POWDER, FOR SOLUTION INTRAVENOUS at 10:41

## 2017-07-31 RX ADMIN — FENTANYL CITRATE 100 MCG: 50 INJECTION, SOLUTION INTRAMUSCULAR; INTRAVENOUS at 10:41

## 2017-07-31 RX ADMIN — PREGABALIN 150 MG: 75 CAPSULE ORAL at 09:28

## 2017-07-31 RX ADMIN — SODIUM CHLORIDE, POTASSIUM CHLORIDE, SODIUM LACTATE AND CALCIUM CHLORIDE 75 ML/HR: 600; 310; 30; 20 INJECTION, SOLUTION INTRAVENOUS at 23:42

## 2017-07-31 RX ADMIN — IBUPROFEN 600 MG: 600 TABLET, FILM COATED ORAL at 23:42

## 2017-07-31 RX ADMIN — ATRACURIUM BESYLATE 50 MG: 10 INJECTION, SOLUTION INTRAVENOUS at 10:41

## 2017-07-31 RX ADMIN — SODIUM CHLORIDE, POTASSIUM CHLORIDE, SODIUM LACTATE AND CALCIUM CHLORIDE 75 ML/HR: 600; 310; 30; 20 INJECTION, SOLUTION INTRAVENOUS at 15:21

## 2017-07-31 RX ADMIN — LIDOCAINE HYDROCHLORIDE 50 MG: 10 INJECTION, SOLUTION EPIDURAL; INFILTRATION; INTRACAUDAL; PERINEURAL at 10:41

## 2017-07-31 RX ADMIN — BUDESONIDE 0.5 MG: 0.5 INHALANT RESPIRATORY (INHALATION) at 19:58

## 2017-07-31 RX ADMIN — IBUPROFEN 600 MG: 600 TABLET, FILM COATED ORAL at 18:10

## 2017-07-31 RX ADMIN — PROPOFOL 50 MG: 10 INJECTION, EMULSION INTRAVENOUS at 11:52

## 2017-07-31 RX ADMIN — SODIUM CHLORIDE, POTASSIUM CHLORIDE, SODIUM LACTATE AND CALCIUM CHLORIDE 9 ML/HR: 600; 310; 30; 20 INJECTION, SOLUTION INTRAVENOUS at 09:00

## 2017-07-31 RX ADMIN — PROPOFOL 150 MG: 10 INJECTION, EMULSION INTRAVENOUS at 10:41

## 2017-07-31 RX ADMIN — LEVOTHYROXINE SODIUM 50 MCG: 50 TABLET ORAL at 18:10

## 2017-07-31 RX ADMIN — POLYETHYLENE GLYCOL 3350 17 G: 17 POWDER, FOR SOLUTION ORAL at 18:10

## 2017-07-31 RX ADMIN — FAMOTIDINE 20 MG: 20 TABLET ORAL at 09:27

## 2017-08-01 VITALS
BODY MASS INDEX: 27.4 KG/M2 | TEMPERATURE: 98.2 F | OXYGEN SATURATION: 94 % | HEART RATE: 68 BPM | RESPIRATION RATE: 16 BRPM | SYSTOLIC BLOOD PRESSURE: 121 MMHG | WEIGHT: 139.55 LBS | HEIGHT: 60 IN | DIASTOLIC BLOOD PRESSURE: 56 MMHG

## 2017-08-01 LAB
ANION GAP SERPL CALCULATED.3IONS-SCNC: 7 MMOL/L (ref 3–11)
BUN BLD-MCNC: 15 MG/DL (ref 9–23)
BUN/CREAT SERPL: 21.4 (ref 7–25)
CALCIUM SPEC-SCNC: 8.9 MG/DL (ref 8.7–10.4)
CHLORIDE SERPL-SCNC: 106 MMOL/L (ref 99–109)
CO2 SERPL-SCNC: 26 MMOL/L (ref 20–31)
CREAT BLD-MCNC: 0.7 MG/DL (ref 0.6–1.3)
DEPRECATED RDW RBC AUTO: 46.6 FL (ref 37–54)
ERYTHROCYTE [DISTWIDTH] IN BLOOD BY AUTOMATED COUNT: 13.4 % (ref 11.3–14.5)
GFR SERPL CREATININE-BSD FRML MDRD: 82 ML/MIN/1.73
GLUCOSE BLD-MCNC: 111 MG/DL (ref 70–100)
HCT VFR BLD AUTO: 36.4 % (ref 34.5–44)
HGB BLD-MCNC: 11.7 G/DL (ref 11.5–15.5)
MCH RBC QN AUTO: 30.2 PG (ref 27–31)
MCHC RBC AUTO-ENTMCNC: 32.1 G/DL (ref 32–36)
MCV RBC AUTO: 94.1 FL (ref 80–99)
PLATELET # BLD AUTO: 229 10*3/MM3 (ref 150–450)
PMV BLD AUTO: 9.6 FL (ref 6–12)
POTASSIUM BLD-SCNC: 4.2 MMOL/L (ref 3.5–5.5)
RBC # BLD AUTO: 3.87 10*6/MM3 (ref 3.89–5.14)
SODIUM BLD-SCNC: 139 MMOL/L (ref 132–146)
WBC NRBC COR # BLD: 12.35 10*3/MM3 (ref 3.5–10.8)

## 2017-08-01 PROCEDURE — 85027 COMPLETE CBC AUTOMATED: CPT | Performed by: OBSTETRICS & GYNECOLOGY

## 2017-08-01 PROCEDURE — 25010000002 ENOXAPARIN PER 10 MG: Performed by: OBSTETRICS & GYNECOLOGY

## 2017-08-01 PROCEDURE — G0378 HOSPITAL OBSERVATION PER HR: HCPCS

## 2017-08-01 PROCEDURE — 80048 BASIC METABOLIC PNL TOTAL CA: CPT | Performed by: OBSTETRICS & GYNECOLOGY

## 2017-08-01 RX ORDER — ONDANSETRON 4 MG/1
4 TABLET, FILM COATED ORAL EVERY 6 HOURS PRN
Qty: 10 TABLET | Refills: 2
Start: 2017-08-01 | End: 2017-08-22

## 2017-08-01 RX ORDER — ACETAMINOPHEN 325 MG/1
650 TABLET ORAL EVERY 4 HOURS PRN
Qty: 40 TABLET | Refills: 0
Start: 2017-08-01 | End: 2017-08-22

## 2017-08-01 RX ORDER — NITROFURANTOIN 25; 75 MG/1; MG/1
100 CAPSULE ORAL NIGHTLY
Qty: 7 CAPSULE | Refills: 0
Start: 2017-08-01 | End: 2017-08-08

## 2017-08-01 RX ORDER — POLYETHYLENE GLYCOL 3350 17 G/17G
17 POWDER, FOR SOLUTION ORAL DAILY
Start: 2017-08-01 | End: 2017-08-22

## 2017-08-01 RX ORDER — OXYCODONE HYDROCHLORIDE 5 MG/1
5 TABLET ORAL EVERY 4 HOURS PRN
Qty: 40 TABLET | Refills: 0
Start: 2017-08-01 | End: 2017-08-10

## 2017-08-01 RX ORDER — PSEUDOEPHEDRINE HCL 30 MG
200 TABLET ORAL 2 TIMES DAILY
Qty: 120 CAPSULE | Refills: 0
Start: 2017-08-01 | End: 2017-08-22

## 2017-08-01 RX ORDER — IBUPROFEN 600 MG/1
600 TABLET ORAL EVERY 6 HOURS SCHEDULED
Qty: 60 TABLET | Refills: 0 | Status: SHIPPED | OUTPATIENT
Start: 2017-08-01 | End: 2017-08-22

## 2017-08-01 RX ADMIN — DOCUSATE SODIUM 200 MG: 100 CAPSULE, LIQUID FILLED ORAL at 08:43

## 2017-08-01 RX ADMIN — IBUPROFEN 600 MG: 600 TABLET, FILM COATED ORAL at 05:45

## 2017-08-01 RX ADMIN — POLYETHYLENE GLYCOL 3350 17 G: 17 POWDER, FOR SOLUTION ORAL at 08:43

## 2017-08-01 RX ADMIN — ENOXAPARIN SODIUM 40 MG: 40 INJECTION SUBCUTANEOUS at 08:43

## 2017-08-01 RX ADMIN — BENAZEPRIL HYDROCHLORIDE 20 MG: 5 TABLET, COATED ORAL at 08:43

## 2017-08-01 NOTE — PLAN OF CARE
Problem: Patient Care Overview (Adult)  Goal: Plan of Care Review  Outcome: Ongoing (interventions implemented as appropriate)    08/01/17 0359   Coping/Psychosocial Response Interventions   Plan Of Care Reviewed With patient   Patient Care Overview   Progress progress toward functional goals as expected   Outcome Evaluation   Outcome Summary/Follow up Plan denies pain, slept well, no distress, probable dc today.       Goal: Adult Individualization and Mutuality  Outcome: Ongoing (interventions implemented as appropriate)  Goal: Discharge Needs Assessment  Outcome: Ongoing (interventions implemented as appropriate)    Problem: Pain, Acute (Adult)  Goal: Identify Related Risk Factors and Signs and Symptoms  Outcome: Ongoing (interventions implemented as appropriate)  Goal: Acceptable Pain Control/Comfort Level  Outcome: Ongoing (interventions implemented as appropriate)

## 2017-08-01 NOTE — PROGRESS NOTES
Gynecologic Oncology   Daily Progress Note    Subjective   Patient did well overnight.  Her pain is controlled.  She is not having nausea or emesis.  She is tolerating a regular diet.  She is ambulating.  She is using her incentive spirometer.        Objective   Temp:  [97 °F (36.1 °C)-98.4 °F (36.9 °C)] 98.2 °F (36.8 °C)  Heart Rate:  [61-95] 61  Resp:  [16-20] 16  BP: (105-168)/(57-77) 131/58  Vitals:    08/01/17 0434   BP:    Pulse:    Resp:    Temp:    SpO2: 97%     I/O last 3 completed shifts:  In: 1340 [P.O.:340; I.V.:1000]  Out: 2135 [Urine:1735; Other:300; Blood:100]     GENERAL: Alert, well-appearing female in no apparent distress.    CARDIOVASCULAR: Normal rate, regular rhythm, no murmurs, rubs, or gallops.    RESPIRATORY: Clear to auscultation bilaterally, normal respiratory effort  GASTROINTESTINAL:  Soft, appropriately tender, non-distended, no rebound or guarding.  Positive bowel sounds.  GENITOURINARY: Byrd in place  SKIN:  Warm, dry, well-perfused.    PSYCHIATRIC: AO x3, with appropriate affect, normal thought processes  EXREMITIES: Symmetric. No peripheral edema.    Lab Results   Component Value Date    WBC 12.35 (H) 08/01/2017    HGB 11.7 08/01/2017    HCT 36.4 08/01/2017    MCV 94.1 08/01/2017     08/01/2017    NEUTROABS 4.13 07/30/2017    GLUCOSE 111 (H) 08/01/2017    BUN 15 08/01/2017    CREATININE 0.70 08/01/2017    EGFRIFNONA 82 08/01/2017     08/01/2017    K 4.2 08/01/2017     08/01/2017    CO2 26.0 08/01/2017    CALCIUM 8.9 08/01/2017         Assessment/Plan   Melani Medina is a 71 y.o. female with pelvic organ prolapse POD1 s/p TVH/BSO/colpocleisis    1.  Post-operative care  -Routine care (encourage ambulation, IS use, saline lock IV, bowel regimen, VTE prophylaxis)  -tolerating regular diet  -pain controlled with oral pain meds    2.  Urinary Retention  Noted at time of byrd placement intra-operatively.   Will go home with indwelling byrd and macrobid suppression.    Plan to follow up on Wednesday, August 2 for voiding trial.     3.  Disposition: plan to dc home today.         Ellen Zuniga MD  08/01/17  8:26 AM     I saw and evaluated the patient. I agree with the findings and the plan of care as documented in the note.    Kerline Hollins MD  08/01/17  5:36 PM

## 2017-08-01 NOTE — DISCHARGE SUMMARY
Gynecologic Oncology   [facility]   Discharge Summary    Date of Admission: 7/31/2017    Date of Discharge: 08/01/17    Admission Diagnoses:    1. Pelvic Organ Prolapse      Discharge Diagnoses:  1. Pelvic Organ Prolapse  2. Urinary retention; noted at OR with byrd placement    Hospital Course:  Melani Medina is a 71 y.o. female who presented symptomatic pelvic organ prolapse.  She was met in consultation and decision was made to proceed with surgery.    On 7/31/2017 she was admitted and underwent a scheduled TVH/BSO/Colpocleisis.  Please refer to dictated operative note for further details.  Post-operatively she did well.  Her diet was advanced.  She was maintained on her home medications for her chronic conditions. Chemistries were unremarkable.  By POD1 she was tolerating a general diet, voiding spontaneously, having her pain controlled with oral medications, and otherwise meeting criteria for discharge and she was discharged home in stable condition.    Discharge Medications:   Melani Medina   Home Medication Instructions PRAVEENA:[id number]    Printed on:08/01/17 7778   Medication Information                      acetaminophen (TYLENOL) 325 MG tablet  Take 2 tablets by mouth Every 4 (Four) Hours As Needed for Mild Pain (1-3).             albuterol (PROVENTIL HFA;VENTOLIN HFA) 108 (90 BASE) MCG/ACT inhaler  Inhale 2 puffs Every 4 (Four) Hours As Needed for Wheezing.             benazepril (LOTENSIN) 20 MG tablet  Take 20 mg by mouth Daily.             docusate sodium 100 MG capsule  Take 200 mg by mouth 2 (Two) Times a Day.             fluticasone (FLOVENT HFA) 110 MCG/ACT inhaler  Inhale 1 puff Daily.             ibuprofen (ADVIL,MOTRIN) 600 MG tablet  Take 1 tablet by mouth Every 6 (Six) Hours.             levothyroxine (SYNTHROID, LEVOTHROID) 50 MCG tablet  Take 50 mcg by mouth Daily.             Multiple Vitamin (MULTI VITAMIN DAILY PO)  Take 1 tablet by mouth Daily.             nitrofurantoin,  macrocrystal-monohydrate, (MACROBID) 100 MG capsule  Take 1 capsule by mouth Every Night for 7 days. Patient to take while indwelling byrd catheter in place.             nystatin (MYCOSTATIN) 426529 UNIT/GM powder  Apply  topically 2 (Two) Times a Day.             ondansetron (ZOFRAN) 4 MG tablet  Take 1 tablet by mouth Every 6 (Six) Hours As Needed for Nausea or Vomiting.             oxyCODONE (ROXICODONE) 5 MG immediate release tablet  Take 1 tablet by mouth Every 4 (Four) Hours As Needed for Moderate Pain (4-6) for up to 9 days.             polyethylene glycol (MIRALAX) packet  Take 17 g by mouth Daily.                 Discharge Instructions:  She was instructed to call or return to medical attention for fever greater than 100.4, inability to tolerate food or liquid, inability to urinate or pass gas, severe pain, questions regarding medications, concerns regarding the incisions, vaginal bleeding or discharge, or for any other acute concerns.  She was also instructed not to drive while taking narcotic pain medications, to abide by pelvic rest, to avoid tub baths, and to avoid heavy lifting for at least 6 weeks.    Condition at Discharge: Stable    Discharge Destination: Home    Results pending at time of discharge: Final surgical pathology    Follow Up: She was instructed to follow up with Dr. Hollins on Thursday 8/3/17 for voiding trial. Further post operative follow up will be discussed at that visit.       Electronically Signed by: Ellen Zuniga MD  Date: 8/1/2017      Time:  3:19 PM    I saw and evaluated the patient. I agree with the findings and the plan of care as documented in the note.    Kerline Hollins MD  08/02/17  7:55 AM

## 2017-08-01 NOTE — PROGRESS NOTES
Discharge Planning Assessment  [facility]     Patient Name: Melani Medina  MRN: [id number]  Today's Date: 8/1/2017    Admit Date: 7/31/2017          Discharge Needs Assessment       08/01/17 1106    Living Environment    Lives With spouse;child(layla), adult    Living Arrangements house   One level with basement    Home Accessibility bed and bath on same level    Number of Stairs to Enter Home 3    Type of Financial/Environmental Concern none    Transportation Available car    Living Environment    Provides Primary Care For no one    Primary Care Provided By spouse/significant other    Quality Of Family Relationships supportive    Able to Return to Prior Living Arrangements yes    Discharge Needs Assessment    Concerns To Be Addressed no discharge needs identified    Readmission Within The Last 30 Days no previous admission in last 30 days    Anticipated Changes Related to Illness other (see comments)   Post surgical recuperation    Equipment Currently Used at Home none    Equipment Needed After Discharge none    Discharge Disposition home or self-care            Discharge Plan       08/01/17 1108    Case Management/Social Work Plan    Plan HOme    Patient/Family In Agreement With Plan yes    Additional Comments I met with patient prior to discharge. She plans home with family and no discharge planning needs identified. She is self pay for her medications        Discharge Placement     No information found        Expected Discharge Date and Time     Expected Discharge Date Expected Discharge Time    Aug 1, 2017               Demographic Summary       08/01/17 1105    Referral Information    Referral Source admission list    Record Reviewed medical record    Contact Information    Permission Granted to Share Information With     Primary Care Physician Information    Name Akilah Simpson             Functional Status       08/01/17 1105    Functional Status Current    Ambulation 2-->assistive person     Transferring 2-->assistive person    Toileting 2-->assistive person    Bathing 2-->assistive person    Dressing 0-->independent    Eating 0-->independent    Communication 0-->understands/communicates without difficulty    Change in Functional Status Since Onset of Current Illness/Injury no    Functional Status Prior    Ambulation 0-->independent    Transferring 0-->independent    Toileting 0-->independent    Bathing 0-->independent    Dressing 0-->independent    Eating 0-->independent    Communication 0-->understands/communicates without difficulty    IADL    Medications independent   self pay    Meal Preparation independent    Housekeeping independent    Laundry independent    Shopping independent    Oral Care independent    Activity Tolerance    Current Activity Limitations other (see comments)   Post surgical restrictions    Usual Activity Tolerance excellent    Current Activity Tolerance excellent    Employment/Financial    Employment/Finance Comments Tells me she has Medicare, [employer], self pays for meds            Psychosocial     None            Abuse/Neglect     None            Legal     None            Substance Abuse     None            Patient Forms     None          Courtney Robles RN

## 2017-08-02 ENCOUNTER — TELEPHONE (OUTPATIENT)
Dept: GYNECOLOGIC ONCOLOGY | Facility: CLINIC | Age: 72
End: 2017-08-02

## 2017-08-02 ENCOUNTER — CLINICAL SUPPORT (OUTPATIENT)
Dept: GYNECOLOGIC ONCOLOGY | Facility: CLINIC | Age: 72
End: 2017-08-02

## 2017-08-02 VITALS
WEIGHT: 139 LBS | RESPIRATION RATE: 18 BRPM | OXYGEN SATURATION: 94 % | DIASTOLIC BLOOD PRESSURE: 71 MMHG | HEART RATE: 101 BPM | TEMPERATURE: 97.8 F | SYSTOLIC BLOOD PRESSURE: 153 MMHG | BODY MASS INDEX: 27.15 KG/M2

## 2017-08-02 LAB
CYTO UR: NORMAL
LAB AP CASE REPORT: NORMAL
LAB AP CLINICAL INFORMATION: NORMAL
Lab: NORMAL
PATH REPORT.FINAL DX SPEC: NORMAL
PATH REPORT.GROSS SPEC: NORMAL

## 2017-08-02 NOTE — PROGRESS NOTES
Procedure   Del Valle Catheter - Discontinue / Voiding Trial  Date/Time: 8/2/2017 1:56 PM  Performed by: STEPHANIE ADORNO  Authorized by: CHALO BENJAMIN   Consent: Verbal consent obtained.  Consent given by: patient  Patient understanding: patient states understanding of the procedure being performed  Procedure consent: procedure consent matches procedure scheduled  Patient identity confirmed: verbally with patient and provided demographic data  Patient tolerance: Patient tolerated the procedure well with no immediate complications      180 mL sterile NS instilled into pt's bladder via Del Valle catheter.  Balloon deflated.  Catheter removed.  Pt tolerated procedure well.    Pt voided 150 mL clear, yellow urine into speci hat without difficulty.    Pt instructed to drink lots of clear, decaffeinated, non-alcoholic po fluids and schedule restroom breaks to void every 2 - 3 hours.  Pt and  verbalized understanding and are aware to call with any questions or concerns.

## 2017-08-02 NOTE — TELEPHONE ENCOUNTER
Received call from pt's son saying his Mom took the Miralax and has swelling all over, very uncomfortable, is able to breathe.  Instructed to do Benadryl immediately, call back in 1 hour with update or sooner if needed.    Son phoned office back.  States his Mom is doing much better.  Instructed him to have her do the Benadryl on a scheduled basis today, call me tomorrow with update or sooner if needed.

## 2017-08-10 ENCOUNTER — TELEPHONE (OUTPATIENT)
Dept: GYNECOLOGIC ONCOLOGY | Facility: CLINIC | Age: 72
End: 2017-08-10

## 2017-08-10 NOTE — TELEPHONE ENCOUNTER
Pt called with concerns of pink vaginal spotting that started today.  She has some abdomen ache as well.  She has been feeling very well the past few days and has significantly increased her activity level.  She has had lots of company, has been climbing her stairs regularly and says she was physically doing about as much as she was prior to surgery now.  I told her that it is not uncommon to see some vaginal spotting after increasing activity level and this is the most likely reason she is having spotting and some pelvic ache.  I advised she take it easy today and rest and call me this afternoon with an update on how she is doing.  If it seems something else is the cause I will have her come in for an appointment.  She agreed and verbalized understanding.

## 2017-08-10 NOTE — TELEPHONE ENCOUNTER
Pt called and said she has been resting since we spoke this morning and already she feels much better.  She had a good bowel movement and she is no longer having pelvic ache.  She still has small amount of pink spotting but otherwise seems to be doing well now.  I advised she continue to monitor and if discomfort returns, spotting increases or any other new or worsening problems ocur not to hesitate to call our office back.  She voiced her  appreciation and verbalized understanding.

## 2017-08-22 ENCOUNTER — OFFICE VISIT (OUTPATIENT)
Dept: GYNECOLOGIC ONCOLOGY | Facility: CLINIC | Age: 72
End: 2017-08-22

## 2017-08-22 VITALS
HEART RATE: 65 BPM | OXYGEN SATURATION: 97 % | RESPIRATION RATE: 16 BRPM | DIASTOLIC BLOOD PRESSURE: 79 MMHG | SYSTOLIC BLOOD PRESSURE: 177 MMHG | WEIGHT: 138 LBS | TEMPERATURE: 97.5 F | BODY MASS INDEX: 26.95 KG/M2

## 2017-08-22 DIAGNOSIS — Z98.890 POST-OPERATIVE STATE: Primary | ICD-10-CM

## 2017-08-22 DIAGNOSIS — R33.9 URINARY RETENTION: ICD-10-CM

## 2017-08-22 PROCEDURE — 99024 POSTOP FOLLOW-UP VISIT: CPT | Performed by: OBSTETRICS & GYNECOLOGY

## 2017-08-22 NOTE — PROGRESS NOTES
Melani Medina  [id number]  1945      Reason for Visit:  Postoperative evaluation    History of Present Illness:    Patient is a very pleasant 72 y.o. woman who presents for a post operative evaluation status post total vaginal hysterectomy, bilateral salpingo-oophorectomy, colpocleisis performed on 7/31/2017.  Surgery was remarkable for urinary retention and patient return to office and passed voiding trial.      Today, patient notes bleeding after activity.  This is made her concerned about being active.  She notes bladder leaking over the last 2 days and incomplete emptying.  She is concerned about urinary retention.  She also notes vaginal discharge.    Past Medical History, Past Surgical History, Social History, Family History have been reviewed and are without significant changes except as mentioned.    Review of Systems   A comprehensive 12 point review of systems was performed and was negative except as mentioned.    Medications:  The current medication list was reviewed in the EMR    ALLERGIES:    Allergies   Allergen Reactions   • Miralax [Polyethylene Glycol] Shortness Of Breath and Swelling   • Other Shortness Of Breath     Horses- SHORTNESS OF BREATH  COLD MEDICATIONS-TACHYCARDIA    • Sulfa Antibiotics Swelling     Swelling of eyes after cataract sx           /79  Pulse 65  Temp 97.5 °F (36.4 °C) (Temporal Artery )   Resp 16  Wt 138 lb (62.6 kg)  LMP 06/13/1990  SpO2 97%  BMI 26.95 kg/m2       Physical Exam  Constitutional:  Patient is a pleasant woman in no acute distress.  Gastrointestinal: Abdomen is soft and appropriately tender.  There is no mass palpated.  There is no rebound or guarding.   Extremities:  Bilateral lower extremities are non-tender.  Gynecologic:GYNECOLOGIC:  External genitalia are consistent with colpocleisis.  On limited speculum examination, the vaginal cuff was intact and no lesions were appreciated.  On bimanual examination, no fullness was appreciated.  Uterus,  cervix and adnexa were absent.  There was no significant tenderness.  Rectovaginal exam was deferred.      Pathology was benign.    ASSESSMENT/PLAN:  Melani Medina returns for a post-operative evaluation today.  All pathology reports were given to patient.    Symptoms concerning for urinary retention.  Plan was made to check postvoid residual and re-anchor Del Valle catheter if greater than 100 mL residual.  Patient refused.  She was given precautions.      Overall, the patient is very pleased with her care.  I recommended continuation of post operative precautions as discussed.     *Letter to referring provider at follow-up.    She is to Return in about 3 weeks (around 9/12/2017).    Note: Speech recognition transcription software was used to dictate portions of this document.  An attempt at proofreading has been made though minor errors in transcription may still be present.  Please do not hesitate to call our office with any questions.    Kerline Hollins MD

## 2017-08-23 ENCOUNTER — DOCUMENTATION (OUTPATIENT)
Dept: GYNECOLOGIC ONCOLOGY | Facility: CLINIC | Age: 72
End: 2017-08-23

## 2017-08-23 NOTE — PROGRESS NOTES
LATE ENTRY:  V/o from Dr. Hollins to do PVR on pt, if pt not able to void or does not feel empty,  anchor byrd cath.  Pt refused to have byrd placed anchored again, states does not want it.  Pt was able to void moderate amount, states that is the most she has been able to do and feels like she emptied all of the way.  Instructed her to void q 2-3 hours while awake, drink plenty of fluids, avoid caffeine for the next couple of days, call if unable to void, does not feel as if she emptied all of the way, or any problems or concerns.  Pt v/u, will call if needed, o/w return to clinic as scheduled.

## 2017-08-28 ENCOUNTER — TELEPHONE (OUTPATIENT)
Dept: GYNECOLOGIC ONCOLOGY | Facility: CLINIC | Age: 72
End: 2017-08-28

## 2017-08-28 NOTE — TELEPHONE ENCOUNTER
Pt phoned office with c/o urinary frequency at times with urinary incontinence.  States is fine for most of the day, but at times has to go every 10-15 minutes and has some urinary incontinence as well.  Dr. Hollins informed, instructed to make appt for pt, do in & out cath. Pt informed, appt made for 8-29-17 1330.  Pt v/u, will be here at 330 8-29-17.

## 2017-08-29 ENCOUNTER — OFFICE VISIT (OUTPATIENT)
Dept: GYNECOLOGIC ONCOLOGY | Facility: CLINIC | Age: 72
End: 2017-08-29

## 2017-08-29 VITALS
OXYGEN SATURATION: 94 % | WEIGHT: 139 LBS | TEMPERATURE: 98.5 F | DIASTOLIC BLOOD PRESSURE: 74 MMHG | HEART RATE: 64 BPM | SYSTOLIC BLOOD PRESSURE: 164 MMHG | RESPIRATION RATE: 20 BRPM | BODY MASS INDEX: 27.15 KG/M2

## 2017-08-29 DIAGNOSIS — Z98.890 POST-OPERATIVE STATE: Primary | ICD-10-CM

## 2017-08-29 PROCEDURE — 99024 POSTOP FOLLOW-UP VISIT: CPT | Performed by: OBSTETRICS & GYNECOLOGY

## 2017-08-30 NOTE — PROGRESS NOTES
Please refer to nursing note.  Del Valle catheter re-anchored.  Patient educated on Del Valle catheter care and intermittent drainage to allow for bladder training.    Follow-up as scheduled.

## 2017-09-05 ENCOUNTER — OFFICE VISIT (OUTPATIENT)
Dept: GYNECOLOGIC ONCOLOGY | Facility: CLINIC | Age: 72
End: 2017-09-05

## 2017-09-05 VITALS
WEIGHT: 139 LBS | OXYGEN SATURATION: 96 % | BODY MASS INDEX: 27.15 KG/M2 | DIASTOLIC BLOOD PRESSURE: 72 MMHG | RESPIRATION RATE: 14 BRPM | TEMPERATURE: 97.4 F | SYSTOLIC BLOOD PRESSURE: 170 MMHG | HEART RATE: 67 BPM

## 2017-09-05 DIAGNOSIS — Z98.890 POST-OPERATIVE STATE: ICD-10-CM

## 2017-09-05 DIAGNOSIS — R33.9 URINARY RETENTION: ICD-10-CM

## 2017-09-05 DIAGNOSIS — R33.9 URINARY RETENTION: Primary | ICD-10-CM

## 2017-09-05 DIAGNOSIS — B37.2 YEAST DERMATITIS: Primary | ICD-10-CM

## 2017-09-05 LAB
BACTERIA UR QL AUTO: ABNORMAL /HPF
HYALINE CASTS UR QL AUTO: ABNORMAL /LPF
RBC # UR: ABNORMAL /HPF
REF LAB TEST METHOD: ABNORMAL
SQUAMOUS #/AREA URNS HPF: ABNORMAL /HPF
WBC UR QL AUTO: ABNORMAL /HPF

## 2017-09-05 PROCEDURE — 99024 POSTOP FOLLOW-UP VISIT: CPT | Performed by: OBSTETRICS & GYNECOLOGY

## 2017-09-05 PROCEDURE — 81001 URINALYSIS AUTO W/SCOPE: CPT | Performed by: OBSTETRICS & GYNECOLOGY

## 2017-09-05 PROCEDURE — 87086 URINE CULTURE/COLONY COUNT: CPT | Performed by: OBSTETRICS & GYNECOLOGY

## 2017-09-05 RX ORDER — NYSTATIN 100000 U/G
CREAM TOPICAL 2 TIMES DAILY
Qty: 30 G | Refills: 1 | Status: SHIPPED | OUTPATIENT
Start: 2017-09-05

## 2017-09-05 NOTE — PROGRESS NOTES
Procedure   Voiding trial / Del Valle Catheter - Discontinue  Date/Time: 9/5/2017 3:39 PM  Performed by: STEPHANIE ADORNO  Authorized by: CHALO BENJAMIN   Consent: Verbal consent obtained.  Consent given by: patient  Patient understanding: patient states understanding of the procedure being performed  Patient consent: the patient's understanding of the procedure matches consent given  Procedure consent: procedure consent matches procedure scheduled  Patient identity confirmed: verbally with patient  Patient tolerance: Patient tolerated the procedure well with no immediate complications      Prior to procedure urine specimen collected via catheter.  200mL sterile NS instilled into pt's bladder via catheter.  Catheter balloon deflated and Del Valle removed.  Pt successfully voided 250mL clear, yellow urine into speci hat.  Pt instructed to continue drinking lots of clear, decaffeinated fluids and schedule voiding at least every 2 hours.  She will call our office with any problems or concerns.

## 2017-09-06 LAB
BACTERIA SPEC AEROBE CULT: NO GROWTH
BILIRUB UR QL STRIP: NEGATIVE
CLARITY UR: CLEAR
COLOR UR: YELLOW
GLUCOSE UR STRIP-MCNC: NEGATIVE MG/DL
HGB UR QL STRIP.AUTO: NEGATIVE
KETONES UR QL STRIP: NEGATIVE
LEUKOCYTE ESTERASE UR QL STRIP.AUTO: NEGATIVE
NITRITE UR QL STRIP: NEGATIVE
PH UR STRIP.AUTO: 5.5 [PH] (ref 5–8)
PROT UR QL STRIP: NEGATIVE
SP GR UR STRIP: 1.01 (ref 1–1.03)
UROBILINOGEN UR QL STRIP: NORMAL

## 2017-09-06 NOTE — PROGRESS NOTES
Melani Medina  [id number]  1945      Reason for Visit:  Postoperative evaluation    History of Present Illness:    Patient is a very pleasant 72 y.o. woman who presents for a post operative evaluation status post total vaginal hysterectomy, bilateral salpingo-oophorectomy, colpocleisis performed on 7/31/2017.  Surgery was remarkable for urinary retention and patient return to office and passed voiding trial.      Unfortunately, patient later developed urinary retention and somewhat more persistent.  She returns to the office today status post insertion of plugs Del Valle catheter with bladder training one week ago.  She reports that she feels her bladder is working appropriately.  She can passed voiding trial today.  She has complaints that her yeast infection is persistent and she cannot use the nystatin powder due to asthma issues.    Past Medical History, Past Surgical History, Social History, Family History have been reviewed and are without significant changes except as mentioned.    Review of Systems   A comprehensive 12 point review of systems was performed and was negative except as mentioned.    Medications:  The current medication list was reviewed in the EMR    ALLERGIES:    Allergies   Allergen Reactions   • Miralax [Polyethylene Glycol] Shortness Of Breath and Swelling   • Other Shortness Of Breath     Horses- SHORTNESS OF BREATH  COLD MEDICATIONS-TACHYCARDIA    • Sulfa Antibiotics Swelling     Swelling of eyes after cataract sx           /72  Pulse 67  Temp 97.4 °F (36.3 °C) (Temporal Artery )   Resp 14  Wt 139 lb (63 kg)  LMP 06/13/1990  SpO2 96%  BMI 27.15 kg/m2       Physical Exam  Constitutional:  Patient is a pleasant woman in no acute distress.  Gastrointestinal: Abdomen is soft and appropriately tender.  There is no mass palpated.  There is no rebound or guarding.   Extremities:  Bilateral lower extremities are non-tender.  Gynecologic: Cutaneous yeast was noted at the groin areas.  External genitalia are consistent with colpocleisis.  On limited speculum examination, the vaginal cuff was intact and no lesions were appreciated.  On bimanual examination, no fullness was appreciated.  Uterus, cervix and adnexa were absent.  There was no significant tenderness.  Rectovaginal exam was deferred.      Pathology was benign.    ASSESSMENT/PLAN:  Melani Medina returns for a post-operative evaluation today.      Urinary retention  -Resolved, recommended frequent voiding    Routine yeast yeast and intolerance of nystatin powder  -nystatin cream prescribed      Overall, the patient is very pleased with her care.  I recommended continuation of post operative precautions as discussed.     She is to return on an as-needed basis.    Note: Speech recognition transcription software was used to dictate portions of this document.  An attempt at proofreading has been made though minor errors in transcription may still be present.  Please do not hesitate to call our office with any questions.    Kerline Hollins MD

## 2018-01-11 ENCOUNTER — OFFICE VISIT (OUTPATIENT)
Dept: GYNECOLOGIC ONCOLOGY | Facility: CLINIC | Age: 73
End: 2018-01-11

## 2018-01-11 VITALS
RESPIRATION RATE: 14 BRPM | TEMPERATURE: 97.8 F | OXYGEN SATURATION: 95 % | BODY MASS INDEX: 27.34 KG/M2 | WEIGHT: 140 LBS | HEART RATE: 67 BPM | SYSTOLIC BLOOD PRESSURE: 172 MMHG | DIASTOLIC BLOOD PRESSURE: 71 MMHG

## 2018-01-11 DIAGNOSIS — N93.9 VAGINAL BLEEDING: Primary | ICD-10-CM

## 2018-01-11 DIAGNOSIS — N89.8 GRANULATION TISSUE OF VAGINA: ICD-10-CM

## 2018-01-11 PROBLEM — Z98.890 POST-OPERATIVE STATE: Status: RESOLVED | Noted: 2017-08-22 | Resolved: 2018-01-11

## 2018-01-11 PROBLEM — N81.9 PROLAPSE OF FEMALE PELVIC ORGANS: Status: RESOLVED | Noted: 2017-07-31 | Resolved: 2018-01-11

## 2018-01-11 PROBLEM — N81.3 COMPLETE UTERINE PROLAPSE: Status: RESOLVED | Noted: 2017-07-13 | Resolved: 2018-01-11

## 2018-01-11 PROBLEM — N95.0 PMB (POSTMENOPAUSAL BLEEDING): Status: RESOLVED | Noted: 2017-07-13 | Resolved: 2018-01-11

## 2018-01-11 PROBLEM — R33.9 URINARY RETENTION: Status: RESOLVED | Noted: 2017-08-22 | Resolved: 2018-01-11

## 2018-01-11 PROBLEM — N81.4 UTEROVAGINAL PROLAPSE: Status: RESOLVED | Noted: 2017-07-24 | Resolved: 2018-01-11

## 2018-01-11 PROCEDURE — 99213 OFFICE O/P EST LOW 20 MIN: CPT | Performed by: NURSE PRACTITIONER

## 2018-01-11 NOTE — PROGRESS NOTES
"GYN ONCOLOGY FOLLOW-UP    Melani Medina  [id number]  1945    Chief Complaint: Follow-up (vaginal bleeding)        History of present illness:  Melani Medina is a 72 y.o. year old female who is here today for complaint of vaginal bleeding. She is s/p TVH/BSO/colpocleisis on 7/31/2017 with Dr. Hollins. Her early post-op course was remarkable for urinary retention per MD notes, but resolved with appropriate intervention. She reports bladder has gotten progressively better over time, only mild leaking with coughing that is tolerable. She does wear a pad for this, which is when she noticed her bleeding.     Melani describes seeing a \"spoonful\" of red blood on her pad at approx 11pm on Tuesday night. She presented to the ER at [facility]. She noticed once small occurrence of bleeding again while there, but reports that was the last event. She denies pain before or after bleeding. Pelvic exam in ER was limited d/t h/o colpocleisis, scant blood was noted to right vaginal vault per ED notes. UA was normal. She underwent CT that was negative except for incidental finding of asymptomatic gallstones and diverticulosis. They were unable to definitively find where bleeding originated, but she was discharged home in stable condition and encouraged to follow-up with our office. She denies bleeding today, no blood noted since 4 am on 1/10/2018.         Past Medical History:   Diagnosis Date   • Abnormal Pap smear of cervix    • Asthma    • Glaucoma    • Hypertension    • Hypothyroid    • Kidney stone    • Prolapse of uterus    • Skin cancer     mohs on nose   • Urinary hesitancy        Past Surgical History:   Procedure Laterality Date   • CATARACT EXTRACTION Left 2015   • CERVIX LESION DESTRUCTION      40+ YEARS AGO   • EYE SURGERY Bilateral 2015    LASER FOR GLAUCOMA   • SC VAGINAL HYSTERECTOMY,UTERUS 250 GMS/< N/A 7/31/2017    Procedure: TOTAL VAGINAL HYSTERECTOMY, BILATERAL SALPINGO OOPHORECTOMY, COLPOCLEISIS;  Surgeon: " Kerline Hollins MD;  Location: [facility];  Service: Gynecology   • SKIN CANCER EXCISION      mohs to nose   • TEETH EXTRACTION      DURING CHILDHOOD. DIFFICULTY AROUSING AFTER SURGERY.       MEDICATIONS: The current medication list was reviewed and reconciled.     Allergies:  is allergic to miralax [polyethylene glycol]; other; and sulfa antibiotics.    Family History   Problem Relation Age of Onset   • Heart disease Father    • Hypertension Father    • Other Father      pace maker   • Diabetes Brother    • Skin cancer Mother    • Other Mother      lung problems from smoking   • Breast cancer Neg Hx    • Ovarian cancer Neg Hx    • Uterine cancer Neg Hx    • Colon cancer Neg Hx        Review of Systems   Constitutional: Negative for appetite change, chills, fatigue, fever and unexpected weight change.   Respiratory: Negative for cough, shortness of breath and wheezing.    Cardiovascular: Negative for chest pain, palpitations and leg swelling.   Gastrointestinal: Negative for abdominal distention, abdominal pain, blood in stool, constipation, diarrhea, nausea and vomiting.   Endocrine: Negative.    Genitourinary: Positive for vaginal bleeding (x2 occurences overnight 2 days ago.). Negative for dyspareunia, dysuria, frequency, genital sores, hematuria, pelvic pain, urgency, vaginal discharge and vaginal pain.   Musculoskeletal: Negative for arthralgias, gait problem and joint swelling.   Neurological: Negative for dizziness, seizures, syncope, weakness, light-headedness, numbness and headaches.   Hematological: Negative for adenopathy.   Psychiatric/Behavioral: Negative.        Physical Exam  Vital Signs: /71  Pulse 67  Temp 97.8 °F (36.6 °C) (Temporal Artery )   Resp 14  Wt 63.5 kg (140 lb)  LMP 06/13/1990  SpO2 95%  BMI 27.34 kg/m2   General Appearance:  alert, cooperative, no apparent distress and appears stated age   Neurologic/Psychiatric: A&O x 3, gait steady, appropriate affect   HEENT:   Normocephalic, without obvious abnormality, mucous membranes moist   Abdomen:   Soft, non-tender, non-distended and no organomegaly   Lymph nodes: No cervical, supraclavicular, inguinal or axillary adenopathy noted   Extremities: Normal, atraumatic; no clubbing, cyanosis, or edema    Pelvic: External Genitalia  atrophic, without lesions  Vagina  is pale, atrophic. , shortened vaginal vault. and consistent with colpocleisis. Pediatric speculum could be used for adequate visualization  Vaginal Cuff  Female Vaginal Cuff: smooth, intact and small granulation tissue noted to mid cuff. Siver nitrate applied. No active bleeding or pooling in the vagina  Uterus  surgically absent  Ovaries  surgically absent bilaterallly  Parametria  smooth  Rectovaginal  Female rectovaginal: deferred         Procedure Notes:  No notes on file    Assessment and Plan:    Melani was seen today for follow-up.    Diagnoses and all orders for this visit:    Vaginal bleeding  Comments:  small amount x 2 occurences, resolved spontaneously    Granulation tissue of vagina        Melani and I discussed her bleeding was most likely r/t small granulation tissue at mid cuff. Overall, healing is appropriate and all surgical areas are intact. Silver nitrate applied to very small area to discourage any further bleeding. She was overall reassured. She was encouraged to call of RTC for any recurrent or heavy vaginal bleeding.     Return if symptoms worsen or fail to improve.      Nya Zarco, PRICILA      Note: Speech recognition transcription software was used to dictate portions of this document.  An attempt at proofreading has been made though minor errors in transcription may still be present.  Please do not hesitate to call our office with any questions.

## (undated) DEVICE — SOL LR 1000ML

## (undated) DEVICE — AIRWY 90MM NO9

## (undated) DEVICE — ELECTRD BLD EXT EDGE 1P COAT 6.5IN STRL

## (undated) DEVICE — DRAPE,SPLIT ,77X120: Brand: MEDLINE

## (undated) DEVICE — SUT VIC 12X27 D8116 BX/12

## (undated) DEVICE — SUT VICYL PLS CTD ANTIB BR 1 27IN VIL

## (undated) DEVICE — GLV SURG DERMASSURE GRN LF PF SZ 6.5

## (undated) DEVICE — ANTIBACTERIAL UNDYED BRAIDED (POLYGLACTIN 910), SYNTHETIC ABSORBABLE SUTURE: Brand: COATED VICRYL

## (undated) DEVICE — FLOSEAL MATRIX IS INDICATED IN SURGICAL PROCEDURES (OTHER THAN IN OPHTHALMIC) AS AN ADJUNCT TO HEMOSTASIS WHEN CONTROL OF BLEEDING BY LIGATURE OR CONVENTIONAL PROCEDURES IS INEFFECTIVE OR IMPRACTICAL.: Brand: FLOSEAL HEMOSTATIC MATRIX

## (undated) DEVICE — MEDI-VAC YANKAUER SUCTION HANDLE W/BULBOUS TIP: Brand: CARDINAL HEALTH

## (undated) DEVICE — TBG VITL VUE XLNG

## (undated) DEVICE — GLV SURG SENSICARE MICRO PF LF 6 STRL

## (undated) DEVICE — SPONGE,LAP,4"X18",XR,ST,5/PK,40PK/CS: Brand: MEDLINE INDUSTRIES, INC.

## (undated) DEVICE — PACK,COLD,STANDARD,OB-PAD,4.5X14.25: Brand: MEDLINE

## (undated) DEVICE — MEDI-VAC NON-CONDUCTIVE SUCTION TUBING: Brand: CARDINAL HEALTH

## (undated) DEVICE — LEX VAGINAL HYSTERECTOMY: Brand: MEDLINE INDUSTRIES, INC.